# Patient Record
Sex: FEMALE | Race: ASIAN
[De-identification: names, ages, dates, MRNs, and addresses within clinical notes are randomized per-mention and may not be internally consistent; named-entity substitution may affect disease eponyms.]

---

## 2020-08-28 ENCOUNTER — HOSPITAL ENCOUNTER (INPATIENT)
Dept: HOSPITAL 92 - L&D | Age: 30
LOS: 5 days | Discharge: HOME | End: 2020-09-02
Attending: FAMILY MEDICINE | Admitting: FAMILY MEDICINE
Payer: MEDICAID

## 2020-08-28 VITALS — BODY MASS INDEX: 32.5 KG/M2

## 2020-08-28 DIAGNOSIS — O32.4XX0: ICD-10-CM

## 2020-08-28 DIAGNOSIS — O61.0: ICD-10-CM

## 2020-08-28 DIAGNOSIS — Z3A.37: ICD-10-CM

## 2020-08-28 DIAGNOSIS — E66.9: ICD-10-CM

## 2020-08-28 DIAGNOSIS — R00.0: ICD-10-CM

## 2020-08-28 LAB
GLUCOSE SERPL-MCNC: 86 MG/DL (ref 70–105)
HBSAG INDEX: 0.14 S/CO (ref 0–0.99)
HGB BLD-MCNC: 13.9 G/DL (ref 12–16)
MCH RBC QN AUTO: 31.3 PG (ref 27–31)
MCV RBC AUTO: 92.1 FL (ref 78–98)
PLATELET # BLD AUTO: 177 THOU/UL (ref 130–400)
RBC # BLD AUTO: 4.45 MILL/UL (ref 4.2–5.4)
SYPHILIS ANTIBODY INDEX: 0.02 S/CO
WBC # BLD AUTO: 9.2 THOU/UL (ref 4.8–10.8)

## 2020-08-28 PROCEDURE — U0003 INFECTIOUS AGENT DETECTION BY NUCLEIC ACID (DNA OR RNA); SEVERE ACUTE RESPIRATORY SYNDROME CORONAVIRUS 2 (SARS-COV-2) (CORONAVIRUS DISEASE [COVID-19]), AMPLIFIED PROBE TECHNIQUE, MAKING USE OF HIGH THROUGHPUT TECHNOLOGIES AS DESCRIBED BY CMS-2020-01-R: HCPCS

## 2020-08-28 PROCEDURE — 85014 HEMATOCRIT: CPT

## 2020-08-28 PROCEDURE — 82805 BLOOD GASES W/O2 SATURATION: CPT

## 2020-08-28 PROCEDURE — 85049 AUTOMATED PLATELET COUNT: CPT

## 2020-08-28 PROCEDURE — 76815 OB US LIMITED FETUS(S): CPT

## 2020-08-28 PROCEDURE — P9016 RBC LEUKOCYTES REDUCED: HCPCS

## 2020-08-28 PROCEDURE — S0020 INJECTION, BUPIVICAINE HYDRO: HCPCS

## 2020-08-28 PROCEDURE — 87340 HEPATITIS B SURFACE AG IA: CPT

## 2020-08-28 PROCEDURE — 86850 RBC ANTIBODY SCREEN: CPT

## 2020-08-28 PROCEDURE — 86901 BLOOD TYPING SEROLOGIC RH(D): CPT

## 2020-08-28 PROCEDURE — 51702 INSERT TEMP BLADDER CATH: CPT

## 2020-08-28 PROCEDURE — 86900 BLOOD TYPING SEROLOGIC ABO: CPT

## 2020-08-28 PROCEDURE — 36430 TRANSFUSION BLD/BLD COMPNT: CPT

## 2020-08-28 PROCEDURE — 82947 ASSAY GLUCOSE BLOOD QUANT: CPT

## 2020-08-28 PROCEDURE — 36415 COLL VENOUS BLD VENIPUNCTURE: CPT

## 2020-08-28 PROCEDURE — 85018 HEMOGLOBIN: CPT

## 2020-08-28 PROCEDURE — 85027 COMPLETE CBC AUTOMATED: CPT

## 2020-08-28 PROCEDURE — 36416 COLLJ CAPILLARY BLOOD SPEC: CPT

## 2020-08-28 PROCEDURE — 86780 TREPONEMA PALLIDUM: CPT

## 2020-08-28 PROCEDURE — 87635 SARS-COV-2 COVID-19 AMP PRB: CPT

## 2020-08-28 NOTE — PDOC.FPROB
FMR OB H&P: HPI





- History of Present Illness


Chief Complaint: mIOL for A2GDM


History of Present Illness: 


 Pt is a 29yo  @ 37.4wks by 8.3wk maximus who presents for mIOL due to A2GDM. 

She was late to care, originally receiving care in [place]. She denies any LOF, 

vag bleeding, discharge, dysuria, headache, NVD, vision changes, LE swelling. +

FM. Patient does desire epidural. She has been taking metformin for her A2GDM. 


Primary Care Physician: 





KB Nascimento





FMR OB H&P: Current Pregnancy





- Prenatal Care


: 1


Para: 0


Gestational age: 37.4


Due date: 20


Dating Criteria: 8.3 wk blanquitao


Course/Complications: 


 A2GDM, obesity, gHTN, late to care








- OB Labs


Blood type: B


RH: positive


Antibody Screen: negative


HIV: negative


RPR: negative


HepBsAg: negative


Rubella: immune


Gonorrhea: negative


Chlamydia: negative


Pap Smear: NILM. HPV neg


3 hour GTT: 2hr GTT 92/158/155


A1c: 5.3


GBS: negative


H&H: 20 12.4/38.8


Platelets: 185





- Additional Ultrasound


Additional: 


 20 EFW 85.1% EFW 3237g


20 BPP 10/10








FMR OB H&P: History





- Past Medical History


PMH: 





PCOS





- OB History


OB History: 





, sIUP





- GYN History


GYN History: 


 pap NILM








- Surgical History


Sx History: 





tonsillectomy





- Social History


Social History: 


no T/A/D








- Family History


Family History: 





ovarian and breast cancer, father with reji ataxia, one family member with 

congenital hearing loss





FMR OB H&P: Medications





- Current


Home Medications: 


 











 Medication  Instructions  Recorded  Confirmed  Type


 


Prenatal 21/Iron Fu/Folic Acid 1 tablet PO DAILY 20 History





[Prenatal Complete Caplet]    


 


metFORMIN [Glucophage] 500 mg PO BID-WM 20 History











Allergies/Adverse Reactions: 


 Allergies











Allergy/AdvReac Type Severity Reaction Status Date / Time


 


No Known Allergies Allergy   Verified 20 19:36














FMR OB H&P: ROS





- Review of Systems


General: denies: fever/chills, weight/appetite/sleep changes, night sweats, 

fatigue


Eyes: denies: vision changes, scotomas, floaters


ENT: denies: nasal congestion, rhinorrhea, sinus pain/pressure, sore throat


Cardiovascular: denies: chest pain, palpitation, edema


Respiratory: denies: cough, congestion, shortness of breath


Gastrointestinal: denies: abdominal pain, nausea, vomiting, diarrhea, 

constipation


Genitourinary (Female): denies: dysuria, vaginal discharge, vaginal pain, 

vaginal bleeding, contractions, vaginal pressure


Musculoskeletal: denies: pain, tenderness, swelling


Integumentary: denies: itching, rash


Breast: denies: skin changes





FMR OB H&P: Vital Signs





- Fetal Heart Tones


Baseline: 150


Variability: moderate


Acceleration: absent


Deceleration: absent


Category: category 1


[place] contractions every: irregular





FMR OB H&P: Physical Exam





- Physical Exam


General: NAD, awake, alert and oriented


HEENT: normocephalic and atraumatic, PERRLA, EOMI, MMM


Neck: supple, FROM


Chest: non-tender to palpation


Heart: RRR, normal S1/S2, no murmurs/rubs/gallops


General: CTAB, no respiratory distress, good air movement, no rales/rhonchi, no 

wheezing, no retractions


Abdomen: soft, gravid, non-tender


Musculoskeletal: FROM in all four extremities


Skin: no rash, good tugor, capillary refill <2 seconds


Psychiatric: intact recent and remote memory, good judgement and insight, 

normal mood and affect





- Pelvic Exam


SVE: 250/-2


Pathak score: 4


Membranes: intact





FMR OB H&P: A/P


Discussion: 


Date/Time: 20





#mIOL for A2GDM


- admit to L&D for mIOL


- SVE 2/50/-2, pathak score of 4. Will start with cytotec


- Monitor FHT; currently 150s, mod variability, cat 1


- US for EFW due to concern for LGA. Previous US 1 week ago with FW of 3200g.


- Desires epidural





#A2GDM


- monitor sugars q4hr, SS for sugars > 110


- hold metformin





#Obesity


- aware





#gHTN


- Monitor BPs for severe range. So far at goal. If severe range, will work up 

for Pre-E.








This H&P was discussed with Dr. KELLY Hummel and Dr. MAKAYLA Ballesteros who agree with the 

above documentation and plan.





Patient is a 29yo  here for mIOL due to A2GDM, uncontrolled. Will admit to L

&D for labor. Will get US with EFW. Pathak score of 4 with SVE of 2/50/-2. Will 

likely start with cytotech if EFW <4500g. Will recheck in 4 hours. Will check 

sugars q4hrs and give SS as needed for sugars >110. Will monitor BPs, so far 

BPs at goal. Discussed with Dr. Ballesteros. --- KELLY Hummel MD








Addendum - Attending





- Attending Attestation


Date/Time: 20 1388





I discussed the management with Dr. Singh last night and personally evaluated 

the patient with Dr Linn this morning.


I agree with the History, Examination, Assessment and Plan documented above 

with any addition or exceptions noted below.


Cervix remains unfavorable.  Repeat dose of cytotex. Patient may have epidural 

when she is ready.

## 2020-08-29 RX ADMIN — Medication SCH ML: at 13:18

## 2020-08-29 RX ADMIN — Medication SCH ML: at 21:20

## 2020-08-29 NOTE — ULT
OB ULTRASOUND:

 

Date:  08/28/2020

 

PROVIDED CLINICAL HISTORY:   

Gestational diabetes. 

 

FINDINGS:

 

No comparisons.

 

A single, live intrauterine gestation is documented in vertex presentation, with estimated gestationa
l age based on ultrasound of 38 weeks and 6 days. Estimated fetal weight is 3490 +/- 517 gm. Placenta
 is anteriorly located without evidence for previa. Amniotic fluid volume appears adequate for gestat
ional age. Fetal anatomic survey was not performed. 

 

Fetal Biometry:

BPD:  9.6 cm, 39 weeks/0 days

HC:  33.9 cm, 39 weeks/0 days

AC:  33.4 cm, 37 weeks/2 days

FL:  7.9 cm, 40 weeks/2 days

 

Fetal heart rate of 133 beats/minute is documented. 

The sonographer indicates fetal movement was identified. 

 

IMPRESSION: 

Single, live intrauterine gestation as described above. 

 

 

POS: FRANKY

## 2020-08-29 NOTE — PDOC.LDPN
Labor & Delivery Progress Note





- Subjective


Subjective: comfortable





- Objective


Vital signs reviewed and normal: yes


General: NAD, resting


Uterine fundus: non tender


SVE: 2/50/-3


FHT: category 1, variability present


[place] contractions every: irregular


-: 


#mIOL for A2GDM not well controlled and Gest HTN. 


- admit to L&D for mIOL


- SVE 2/50/-2, unchanged from previous check, pathak score of 4, gave 2nd dose 

of cytotec


- Monitor FHT; currently 150s, mod variability, cat 1


- US for EFW 3400g


- Desires epidural





#A2GDM


- monitor sugars q4hr, SS for sugars > 110


- hold metformin


- most recent BS 96





#Obesity


- aware





#gHTN


- Monitor BPs for severe range. So far at goal. If severe range, will work up 

for Pre-E.

## 2020-08-29 NOTE — PDOC.LDPN
Labor & Delivery Progress Note





- Subjective


Subjective: comfortable





- Objective


Abnormal vital signs: 


General: NAD, resting, breathing through contractions


Dilation: 3


Effacement: 75%


Station: -2


FHT: category 1


[place] contractions every: 1-3 minutes


Other exam findings: SROM with trace meconium @ cervical check @ ~1800


IUPC placed: yes


Resuscitative measures: maternal IV fluids


Plan: pitocin for augmentation


-: 





#mIOL for uncontrolled A2GDM and gHTN 


- SVE 3/75/-2 @ 1800 & pit temporarily dc'd 2/2 intractable pain from 

inadequate anesthesia. SROM shortly before last cervical check with trace 

meconium stained fluid noted per nurse. 


- New epidural now in place & patient MUCH more comfortable. IUPC placed and 

patient having inadequate contractions. Will restart pitocin & titrate to 

achieve adequate contractions as tolerated by the patient & fetus.


- Current FHTs: Cat I, baseline 140s, moderate variability, + accels, no decels





#A2GDM


- monitor sugars q4hr, SS for sugars > 110


- hold metformin


- most recent BS 90 @ ~1600





#Obesity


- aware





#gHTN


- Monitor BPs for severe range. So far at goal. If severe range, will work up 

for Pre-E & give PRN meds.





Dispo: Will recheck in ~2 hours to assess for cervical change. 

















Addendum - Attending





- Attending Attestation


Date/Time: 08/29/20 2115





I personally evaluated the patient and discussed the management with Dr. Nascimento.


I agree with the History, Examination, Assessment and Plan documented above 

with any addition or exceptions noted below.


We reviewed the care plan with Odalis and Alex.  They are relieved that the new 

epidural is working well.

## 2020-08-29 NOTE — PDOC.LDPN
Labor & Delivery Progress Note





- Subjective


Subjective: comfortable





- Objective


Vital signs reviewed and normal: yes


General: NAD, resting


Uterine fundus: non tender


Dilation: 3


Effacement: 75%


Station: -2


FHT: category 1


[employer] contractions every: uterine irritability


Plan: pitocin for augmentation


-: 


#mIOL for A2GDM not well controlled and Gest HTN. 


- SVE 2/50/-2 (Bower 3-4). Unchanged, will give cytotec #3


- SVE 3/75/-2 (Bower 8). Will start pitocin, titrate as necessary


- FHT: Cat I/ [employer]: uterine irritability


- US for EFW 3400g


- Desires epidural


- stadol x1 for some uterine discomfort





#A2GDM


- monitor sugars q4hr, SS for sugars > 110


- hold metformin


- most recent BS 91





#Obesity


- aware





#gHTN


- Monitor BPs for severe range. So far at goal. If severe range, will work up 

for Pre-E.

## 2020-08-29 NOTE — PDOC.LDPN
Labor & Delivery Progress Note





- Subjective


Subjective: comfortable





- Objective


Vital signs reviewed and normal: yes


General: NAD


Uterine fundus: non tender


Dilation: 3


Effacement: 75%


Station: -2


FHT: category 2


[place] contractions every: every 3-4 minutes


Plan: continue plan of care


-: 


#mIOL for A2GDM not well controlled and Gest HTN. 


- SVE 2/50/-2 (Bower 3-4). Unchanged, will give cytotec #3


- SVE 3/75/-2 (Bower 8). Will start pitocin, titrate as necessary


- SVE: 3/75/-2. Pit at 8


- FHT: Cat I/ [place]: 140s, minimal variability, no accels, cat II -- will give 

patient juice


- US for EFW 3400g


- Desires epidural


- stadol x1 for some uterine discomfort





#A2GDM


- monitor sugars q4hr, SS for sugars > 110


- hold metformin


- most recent BS 79





#Obesity


- aware





#gHTN


- Monitor BPs for severe range. So far at goal. If severe range, will work up 

for Pre-E.

## 2020-08-29 NOTE — PDOC.LDPN
Labor & Delivery Progress Note





- Subjective


Subjective: other (mild abd pain)





- Objective


Vital signs reviewed and normal: yes


General: NAD, resting


Dilation: 2


Effacement: 50%


Station: -3


FHT: category 1


[place] contractions every: uterine irritability


Plan: continue plan of care, labor augmentation


-: 





#mIOL for A2GDM not well controlled and Gest HTN. 


- SVE 2/50/-2 (Bower 3-4). Unchanged, will give cytotec #3


- FHT: Cat I/ [place]: Uterine irritability


- US for EFW 3400g


- Desires epidural


- stadol x1 for some uterine discomfort





#A2GDM


- monitor sugars q4hr, SS for sugars > 110


- hold metformin


- most recent BS 96





#Obesity


- aware





#gHTN


- Monitor BPs for severe range. So far at goal. If severe range, will work up 

for Pre-E.

## 2020-08-30 LAB
ANALYZER IN CARDIO: (no result)
ANALYZER IN CARDIO: (no result)
BASE EXCESS STD BLDA CALC-SCNC: -6.2 MEQ/L
BASE EXCESS STD BLDV CALC-SCNC: -4.7 MEQ/L
HCO3 BLDA-SCNC: 22.9 MEQ/L (ref 22–28)
HCO3 BLDV-SCNC: 23 MEQ/L (ref 22–28)
HGB BLD-MCNC: 11.7 G/DL (ref 12–16)
PH BLDV: 7.26 [PH] (ref 7.32–7.43)

## 2020-08-30 PROCEDURE — 0UQC7ZZ REPAIR CERVIX, VIA NATURAL OR ARTIFICIAL OPENING: ICD-10-PCS | Performed by: FAMILY MEDICINE

## 2020-08-30 PROCEDURE — 3E033VJ INTRODUCTION OF OTHER HORMONE INTO PERIPHERAL VEIN, PERCUTANEOUS APPROACH: ICD-10-PCS | Performed by: FAMILY MEDICINE

## 2020-08-30 PROCEDURE — 3E0P7VZ INTRODUCTION OF HORMONE INTO FEMALE REPRODUCTIVE, VIA NATURAL OR ARTIFICIAL OPENING: ICD-10-PCS | Performed by: FAMILY MEDICINE

## 2020-08-30 RX ADMIN — Medication SCH ML: at 03:53

## 2020-08-30 RX ADMIN — DOCUSATE CALCIUM SCH: 240 CAPSULE, LIQUID FILLED ORAL at 20:21

## 2020-08-30 NOTE — PDOC.CONS
- Consultation





I was called to the operating room by Dr. Ballesteros for assistance repairing an 

extension of the hysterotomy following the delivery of a male infant for 

nonreassuring fetal status and arrest of descent after pushing for 2 hours.  

After examining the patient, the hysterotomy was noted to have an extension on 

the left side, tracking deep inferiorly and completely transecting the cervix.  

A branch of the uterine artery was also noted to be involved and bleeding 

briskly.  Due to the depth of the extension and proximity to the ureter, I 

called for Dr. Bower or Shawn for assistance in the repair, both of whom 

were present. The cervical laceration was repaired using 0-monocryl.  

Hemostasis of the extension and involved bleeding artery was obtained with an [place] stitch.  The extension was then repaired and then incorporated into the 

hysterotomy repair, which was closed with 0 Monocryl with good hemostasis 

throughout. Floseal was applied across the repair and good hemostasis was again 

noted.  For full details on the remainder of the surgery, please see the 

operative report by Dr. Ballesteros.

## 2020-08-30 NOTE — PDOC.BPN
- Brief Progress Note


Patient reached complete cervical dilation about 6am. Nurs pushed with patient 

for alomst an hour, then the patietn pushed with our team of doctors for over 2 

hours.  Fetal station did not change signifiacntly, remainign about +2 station 

with increasing caput.  FHT baseline became tachycardic with minimal 

variability adnintermittent late decels during the final hour of pushing.  

Because of the lack of descent and Cat 2 tracing, I advised that a  

delivery was safest approach to an expedited deliver, I/R/B/A discussed a part 

of this conversation.  Consideration for vacuum assistance was given, but A2GDM 

and arrest of descent in the second stage of labor caused significant concern 

for shoulder dystocia.  Alex and Odalis expressed their disappointment that 

 was needed, but consented to  delivery.

## 2020-08-30 NOTE — PDOC.OPDEL
OB Operative/Delivery Note





- Additional Findings/Plan


Compilations/Other Findings: 





Date of Procedure: 2020





Resident Surgeons: Dr. Luiza Nascimento (PGY3, continuity provider), Dr. Brooklynn Flores 


Assistant Surgeon: Dr. Ana Steve (PGY1)


Attending Surgeon: Dr. Roberto Ballesteros, present and assisting during entire 

procedure





Intra-Operative Consult: Ob/Gyn Laborist, Dr. Renteria. Additional assistance 

provided by Dr. Bower & Dr. Escobar.





Procedure: Primary low transverse caesarean section





Anesthesia: epidural


Quantitative Blood Loss: 1920 mL


Complications: Postpartum hemorrhage requiring transfusion of 2 units packed 

red blood cells.


Specimens: Arterial and venous cord gases. Cord blood sent to lab for blood 

type.


Findings: Grossly normal male infant with Apgars of 8 and 9 at 1 an 5 minutes 

of life, respectively. Grossly normal placenta with 3 vessel cord discarded.


Drains: [place] to gravity draining blood-tinged urine.





Preoperative Diagnosis:


1) Term intrauterine pregnancy


2) A2GDM


3) Gestational HTN


4) Obesity


5) Late transfer of care


6) Arrest of second stage of labor


7) Persistent Category 2 FHT tracing





Postoperative Diagnosis:


1) Term intrauterine pregnancy, delivered by PLTCS


2) A2GDM


3) Gestational HTN


4) Obesity


5) Late transfer of care


6) Arrest of second stage or labor


7) Postpartum hemorrhage





Indications: The patient is a 30 year old  female at 37.6 weeks gestation 

who presented to L&D for medically indicated induction of A2GDM, followed by 

arrest of descent and non-reassuring fetal heart tones





Procedure in Detail: After risks, benefits, and alternatives were explained to 

the patient, she gave informed consent. Pre-operative antibiotics included 

Cefazolin 2 gram IV and Azithromycin 500mg IV. The patient was taken to the 

operating room, and epidural anesthesia was re-dosed. She was placed in the 

supine position with a left tilt and prepped and draped in usual sterile 

fashion. A Pfannenstiel incision was made with a scalpel and carried down to 

the level of the fascia which was sharply nicked. The fascial cut was extended 

bilaterally with [place] scissors. The inferior and superior edges of the cut 

fascial edges were elevated with Koc[place] clamps, and the underlying rectus 

muscles were sharply and bluntly dissected free. 





The recti were divided digitally and retracted manually. The peritoneum was 

entered bluntly and retracted manually. Bladder blade was placed. A low 

transverse score was made with the scalpel, and the uterus was entered in the 

midline bluntly. Clear fluid was seen. The hysterotomy was widened manually in 

a cephalo-cadual fashion. The infant was noted to be vertex and was delivered 

by flexion of the head and lift out of pelvis followed by fundal pressure. 

Mouth and nares were bulb suctioned. Cord clamped and cut; cord section for 

gases was obtained. Grossly normal male infant was handed to waiting nurse at 

the [facility] Cord blood was obtained. Placenta was manually extracted, found to 

be intact with 3 vessel cord and discarded. 





The uterus was externalized, and the endometrium was curetted with a dry lap. 

Uterus was boggy, so hemabate was given aong with IV pitocin. The bladder blade 

was replaced. Brisk bleeding was noted to be coming from a left extension of 

the hysterotomy, which tracked inferiorly and into the cervix. Ring clamps were 

placed and pressure applied for temporary hemostasis when consult for laborist 

Dr. Renteria was called. Dr. Renteria evaluated the extension and called Dr. Bower and Dr. Escobar. The extension involved a branch of the uterine artery. 

Repair was completed with 0-Monocryl and [place] stitch around branch of the 

uterine artery. The rest of the hysterotomy was closed in a running locking 

fashion with 0-Monocryl and noted to be hemostatic. The abdomen was irrigated 

with saline and suctioned free of clots.  Uterus was internalized. Floseal was 

applied; hysterotomy was again noted to be hemostatic.  Intraoperative EBL was 

monitored, and together with maternal clinical assessment by Dr James, the 

decision was made to proceed with PRBC transfusion of 2 units. 





The fascia was closed with a running non-locking 0-Vicryl suture. Peritoneum 

was closed with running non-locking 3-0 vicryl suture. The subcutaneous tissue 

was closed with 4 interrupted sutures using 2-0 chromic. The skin was 

approximated with staples, and a dressing was placed. All counts were correct. 

The patient tolerated the procedure well and was taken to the recovery room in 

stable condition with a unit of blood transfusing.





Post delivery plan: routine recovery

## 2020-08-30 NOTE — PDOC.LDPN
Labor & Delivery Progress Note





- Subjective


Subjective: painful contractions





- Objective


Vital signs reviewed and normal: yes


General: NAD


Uterine fundus: non tender


Dilation: 9


Effacement: 100%


Station: 0


FHT: category 1


AROM: meconium stained fluid


IUPC placed: yes


Resuscitative measures: maternal IV fluids, maternal position change


Plan: continue plan of care, labor augmentation, pitocin for augmentation


-: 





#mIOL for uncontrolled A2GDM and gHTN 


- SVE 3/75/-2 @ 1800 & pit temporarily dc'd 2/2 intractable pain from 

inadequate anesthesia. SROM shortly before @ 1575 cervical check with trace 

meconium stained fluid noted per nurse. 


- New epidural now in place & patient much more comfortable. IUPC placed. Pit 

restarted to titrate to achieve adequate contractions as tolerated by the 

patient & fetus.


- Current FHTs: Cat I, baseline 140s, moderate variability, + accels, no decels





#A2GDM


- monitor sugars q4hr, SS for sugars > 110


- hold metformin


- most recent BS 90 @ ~1600





#Obesity


- aware





#gHTN


- Monitor BPs for severe range. So far at goal. If severe range, will work up 

for Pre-E & give PRN meds.





Dispo: Will recheck in ~1 hours to assess for cervical change.

## 2020-08-31 LAB
HGB BLD-MCNC: 10.6 G/DL (ref 12–16)
HGB BLD-MCNC: 9 G/DL (ref 12–16)
MCH RBC QN AUTO: 31.2 PG (ref 27–31)
MCV RBC AUTO: 91.9 FL (ref 78–98)
PLATELET # BLD AUTO: 108 THOU/UL (ref 130–400)
RBC # BLD AUTO: 2.89 MILL/UL (ref 4.2–5.4)
WBC # BLD AUTO: 11.8 THOU/UL (ref 4.8–10.8)

## 2020-08-31 RX ADMIN — SIMETHICONE PRN MG: 80 TABLET, CHEWABLE ORAL at 21:49

## 2020-08-31 RX ADMIN — Medication PRN TUBE: at 18:17

## 2020-08-31 RX ADMIN — HYDROCODONE BITARTRATE AND ACETAMINOPHEN PRN TAB: 5; 325 TABLET ORAL at 23:10

## 2020-08-31 RX ADMIN — HYDROCODONE BITARTRATE AND ACETAMINOPHEN PRN TAB: 5; 325 TABLET ORAL at 09:23

## 2020-08-31 RX ADMIN — HYDROCODONE BITARTRATE AND ACETAMINOPHEN PRN TAB: 5; 325 TABLET ORAL at 18:16

## 2020-08-31 RX ADMIN — DOCUSATE CALCIUM SCH MG: 240 CAPSULE, LIQUID FILLED ORAL at 21:49

## 2020-08-31 RX ADMIN — DOCUSATE CALCIUM SCH MG: 240 CAPSULE, LIQUID FILLED ORAL at 07:46

## 2020-08-31 NOTE — PDOC.OBPPN
FMR OB Postpartum PN: Subj





- Interval History


Hospital Day: 4


Postpartum Day: 





1


Chief Complaint: Some abdominal pain with movement.


Indentification: G1 now P1 who is PP day #1 s/p pLTCS for failure to progress 

in 2nd stage 


Interval History: Patient received 2U PRBCs following surgery 2/2 PPH.





FMR OB Postpartum PN: Obj





- Maternal


Vital signs: 


BP: 96/51  HR: 120 RR: 16 Tmax: 98.9F Pox: 96% on RA  Wt: 93 kg   








- Urine output


I&O: 


 











 20





 06:59 06:59 06:59


 


Intake Total   1859


 


Output Total   2476


 


Balance   -617














- Lochia


Lochia: 





minimal





- Pain Management


Pain scale: 0


Intervention: oral medication





FMR OB Postpartum PN: Exam





- Physical Exam


General: NAD, awake, alert and oriented


HEENT: normocephalic and atraumatic, MMM, conjunctiva clear, grossly normal 

vision, grossly normal hearing


Neck: supple, FROM


Heart: normal S1/S2, no murmurs/rubs/gallops, no edema


Deviation from normal: tachycardic but regular rhythm


General: CTAB, no respiratory distress, good air movement


Abdomen: soft, fundus(cm) (firm below umbilicus)


Musculoskeletal: normal gait and station, FROM in all four extremities


Neurological: cranial nerves II through XII intact, sensation to pain,touch and 

proprioception grossly normal, no focal deficit


Skin: no rash, good tugor


Postpartum: bandage intact (clean & dry on exam)


Lymphatic: no unusual bruising or bleeding


Psychiatric: intact recent and remote memory





- Pelvic Exam


Postpartum: no discharge, no edema, normal lochia





FMR OB Postpartum PN: Data





- Labs


Lab results: 


 Laboratory Results - last 24 hr











  20





  19:55 11:10 11:11


 


WBC   


 


RBC   


 


Hgb   


 


Hct   


 


MCV   


 


MCH   


 


MCHC   


 


RDW   


 


Plt Count   


 


MPV   


 


Bicarbonate Actual   22.9 


 


ABG Base Excess   -6.2 L 


 


VBG HCO3    23


 


VBG Base Excess    -4.7 L


 


Cord ABG pH   7.201 L 


 


Cord ABG PCO2 ([place])   59.9 H 


 


Cord VBG pH    7.26 L


 


Cord VBG pCO2    52.5


 


POC Glucose   


 


Blood Type  B POSITIVE  


 


Antibody Screen  NEGATIVE  


 


Crossmatch  See Detail  














  20/20





  11:39 18:00 00:21


 


WBC   


 


RBC   


 


Hgb   11.7 L 


 


Hct   33.5 L 


 


MCV   


 


MCH   


 


MCHC   


 


RDW   


 


Plt Count   


 


MPV   


 


Bicarbonate Actual   


 


ABG Base Excess   


 


VBG HCO3   


 


VBG Base Excess   


 


Cord ABG pH   


 


Cord ABG PCO2 ([place])   


 


Cord VBG pH   


 


Cord VBG pCO2   


 


POC Glucose  98   99


 


Blood Type   


 


Antibody Screen   


 


Crossmatch   














  20





  04:33 05:56


 


WBC   11.8 H


 


RBC   2.89 L


 


Hgb   9.0 L


 


Hct   26.6 L


 


MCV   91.9


 


MCH   31.2 H


 


MCHC   33.9


 


RDW   12.4


 


Plt Count   108 L


 


MPV   10.0


 


Bicarbonate Actual  


 


ABG Base Excess  


 


VBG HCO3  


 


VBG Base Excess  


 


Cord ABG pH  


 


Cord ABG PCO2 ([place])  


 


Cord VBG pH  


 


Cord VBG pCO2  


 


POC Glucose  89 


 


Blood Type  


 


Antibody Screen  


 


Crossmatch  














FMR OB Postpartum PN: A/P





- Problem List


(1) Postpartum care following  delivery


Current Visit: Yes   Status: Acute   Code(s): Z39.2 - ENCOUNTER FOR ROUTINE 

POSTPARTUM FOLLOW-UP   





(2) Gestational diabetes mellitus (GDM)


Current Visit: Yes   Status: Acute   Code(s): O24.419 - GESTATIONAL DIABETES 

MELLITUS IN PREGNANCY, UNSP CONTROL   


Qualifiers: 


   Gestational diabetes mellitus control: oral hypoglycemic-controlled 





(3) Gestational hypertension


Current Visit: Yes   Status: Acute   Code(s): O13.9 - GESTATIONAL HTN W/O 

SIGNIFICANT PROTEINURIA, UNSP TRIMESTER   





(4) Obesity


Current Visit: Yes   Status: Acute   Code(s): E66.9 - OBESITY, UNSPECIFIED   


Disposition: 





31YO G1 now  who is PP day #1 s/p pLTCS for arrest of descent.





#PP day #1 s/p pLTCS for arrest of descent:


- Tolerating PO and passing gas. No voiding yet as gao still in. Pain 

controlled on current regimen. Incision covered with pressure dressing but 

dressing was clean & dry. Continue routine post-op care. 





#PPH: QBL ~1929mL & was given 2U PRBCs since delivery. Hgb down to 9.0 this AM 

& HR in 120s @ 0400. If still tachycardic @ next vital check with recheck H/H 

this afternoon & consider giving more blood products. 





#A2GDM


- monitor sugars q4hr but max since admission 114 prior to delivery. All WNLs 

since delivery. Will check ACHS today & likely dc accuchecks tomorrow 


- Continue to hold metformin





#Obesity


- aware





#gHTN


- NL/low BPs since delivery & no severe range pressures since admission. If 

severe range, will work up for Pre-E & give PRN meds.





Dispo: Will continue routine post-C/S care & continue to monitor VS closely for 

need for further transfusion. 





Discussion: 


Date/Time: 20 1109











This H&P was discussed with Dr. Moore who agree with the above documentation 

and plan.








Addendum - Attending





- Attending Attestation


Date/Time: 20 104





I personally evaluated the patient and discussed the management with Dr. Nascimento. 


I agree with the History, Examination, Assessment and Plan documented above 

with any addition or exceptions noted below.





repeat CBC this afternoon due to persistent tachycardia. patient asymptomatic. 

Will consider additional transfusion of blood products if it continues to down 

trend.

## 2020-09-01 LAB — HGB BLD-MCNC: 8.9 G/DL (ref 12–16)

## 2020-09-01 PROCEDURE — 30233N1 TRANSFUSION OF NONAUTOLOGOUS RED BLOOD CELLS INTO PERIPHERAL VEIN, PERCUTANEOUS APPROACH: ICD-10-PCS | Performed by: FAMILY MEDICINE

## 2020-09-01 RX ADMIN — HYDROCODONE BITARTRATE AND ACETAMINOPHEN PRN TAB: 5; 325 TABLET ORAL at 18:44

## 2020-09-01 RX ADMIN — SIMETHICONE PRN MG: 80 TABLET, CHEWABLE ORAL at 07:48

## 2020-09-01 RX ADMIN — Medication PRN TUBE: at 21:25

## 2020-09-01 RX ADMIN — SIMETHICONE PRN MG: 80 TABLET, CHEWABLE ORAL at 20:29

## 2020-09-01 RX ADMIN — HYDROCODONE BITARTRATE AND ACETAMINOPHEN PRN TAB: 5; 325 TABLET ORAL at 23:25

## 2020-09-01 RX ADMIN — HYDROCODONE BITARTRATE AND ACETAMINOPHEN PRN TAB: 5; 325 TABLET ORAL at 03:11

## 2020-09-01 RX ADMIN — DOCUSATE CALCIUM SCH MG: 240 CAPSULE, LIQUID FILLED ORAL at 07:48

## 2020-09-01 RX ADMIN — HYDROCODONE BITARTRATE AND ACETAMINOPHEN PRN TAB: 5; 325 TABLET ORAL at 07:49

## 2020-09-01 RX ADMIN — DOCUSATE CALCIUM SCH MG: 240 CAPSULE, LIQUID FILLED ORAL at 21:26

## 2020-09-01 RX ADMIN — HYDROCODONE BITARTRATE AND ACETAMINOPHEN PRN TAB: 5; 325 TABLET ORAL at 13:30

## 2020-09-01 NOTE — PDOC.OBPPN
FMR OB Postpartum PN: Subj





- Interval History


Hospital Day: 5


Postpartum Day: 





2


Chief Complaint: Abodminal cramping & pain with movement.


Indentification: G1 now  who is PP day #2 s/p pLTCS for failure of descent.


Interval History: Hgb continue to downtrend & HR still elevated. Limited 

movement 2/2 pain.





FMR OB Postpartum PN: Obj





- Maternal


Vital signs: 


BP: 106/64  HR: 107 RR: 14 Tmax: 99.0F Pox: 97% on RA  Wt: 93 kg   








- Urine output


I&O: 


 











 20





 06:59 06:59 06:59


 


Intake Total  1859 


 


Output Total  7705 1851


 


Balance  -617 -1851














- Lochia


Lochia: 





moderate





- Pain Management


Pain scale: 0


Intervention: oral medication





FMR OB Postpartum PN: Exam





- Physical Exam


General: NAD, awake, alert and oriented


HEENT: normocephalic and atraumatic, MMM, grossly normal vision, grossly normal 

hearing


Neck: supple, FROM


Heart: normal S1/S2, no murmurs/rubs/gallops, no edema, other (tachycardic but 

regular rhythm)


General: CTAB, no respiratory distress, good air movement


Abdomen: soft, fundus(cm) (firm just above umbilicus)


Musculoskeletal: FROM in all four extremities


Neurological: cranial nerves II through XII intact, sensation to pain,touch and 

proprioception grossly normal, no focal deficit


Skin: no rash


Postpartum: incision healing well, no erythema, no edema, no drainage, 

appropriately tender


Lymphatic: no unusual bruising or bleeding


Psychiatric: intact recent and remote memory, good judgement and insight, 

normal mood and affect





- Pelvic Exam


Postpartum: no discharge, no edema, normal lochia





FMR OB Postpartum PN: Data





- Labs


Lab results: 


 Laboratory Results - last 24 hr











  20





  13:30 14:05 17:34


 


Hgb   10.6 L 


 


Hct   31.7 L 


 


POC Glucose  91   124 H














  20





  22:10 05:29 06:05


 


Hgb   8.9 L 


 


Hct   26.9 L 


 


POC Glucose  132 H   103














FMR OB Postpartum PN: A/P





- Problem List


(1) Postpartum care following  delivery


Current Visit: Yes   Status: Acute   Code(s): Z39.2 - ENCOUNTER FOR ROUTINE 

POSTPARTUM FOLLOW-UP   





(2) Gestational diabetes mellitus (GDM)


Current Visit: Yes   Status: Acute   Code(s): O24.419 - GESTATIONAL DIABETES 

MELLITUS IN PREGNANCY, UNSP CONTROL   


Qualifiers: 


   Gestational diabetes mellitus control: oral hypoglycemic-controlled 





(3) Gestational hypertension


Current Visit: Yes   Status: Acute   Code(s): O13.9 - GESTATIONAL HTN W/O 

SIGNIFICANT PROTEINURIA, UNSP TRIMESTER   





(4) Obesity


Current Visit: Yes   Status: Acute   Code(s): E66.9 - OBESITY, UNSPECIFIED   


Disposition: 





29YO G1 now  who is PP day #2 s/p pLTCS for arrest of descent.





#PP day #2 s/p pLTCS for arrest of descent:


- Tolerating PO, voiding, ambulating, and passing gas. Reports lochia is 

similar to menstruation. Pain semi-controlled on current regimen. Incision 

clean & dry & intact. Continue routine post-op care. 





#PPH: QBL ~2127mL since delivery. Is s/p 2U PRBCs since delivery. Hgb down to 

8.9 this AM but HR improved. Will continue PNVs & PO iron.





#post-op pain: Will add 2 tabs of Norco PRN in addition to SHAYNA motrin today. 

Encouraged ambulation as some discomfort could also be from gas/constipation & 

ambulation with help with this. Adding PRN miralax & simethicone added 

overnight.





#A2GDM


- Will continue QID accuchecks today, fasting & 2hr PP, as max BG yesterday was 

132 before dinner. 


- Continue to hold metformin for now. 


- Needs 6 week PP DM screening.





#Obesity


- aware





#gHTN


- NL/low BPs since delivery & no severe range pressures since admission. If 

severe range, will work up for Pre-E & give PRN meds.





Dispo: Will continue routine post-C/S care & continue to monitor VS closely. 





Discussion: 


Date/Time: 20 0656











This H&P was discussed with Dr. Moore who agree with the above documentation 

and plan.








Addendum - Attending





- Attending Attestation


Date/Time: 20 1000





I personally evaluated the patient and discussed the management with Dr. Nascimento. 


I agree with the History, Examination, Assessment and Plan documented above 

with any addition or exceptions noted below.





Patient dizzy upon standing this morning. If this persists this afternoon in 

the context of persistently elevated pulse, will crossmatch and tx 1u PRBCs.

## 2020-09-01 NOTE — PDOC.BPN
- Brief Progress Note





Pt is a 29 yo  who is PP day 2. She is resting comfortably in bed. She is 

still tachycardic and on exam, at rest, is at 120. She endorses dizziness 

whenever she gets up to walk and she looks pale. Her H/H was 8.9/26.9 this 

morning which was down from previous H/H. Discussed the plan to transfuse 1 U 

packed RBCs and recheck H/H in the AM. She agreed with the plan and voiced 

understanding.

## 2020-09-02 VITALS — TEMPERATURE: 97.9 F | DIASTOLIC BLOOD PRESSURE: 84 MMHG | SYSTOLIC BLOOD PRESSURE: 122 MMHG

## 2020-09-02 LAB
HGB BLD-MCNC: 10.1 G/DL (ref 12–16)
PLATELET # BLD AUTO: 141 THOU/UL (ref 130–400)

## 2020-09-02 RX ADMIN — HYDROCODONE BITARTRATE AND ACETAMINOPHEN PRN TAB: 5; 325 TABLET ORAL at 13:15

## 2020-09-02 RX ADMIN — HYDROCODONE BITARTRATE AND ACETAMINOPHEN PRN TAB: 5; 325 TABLET ORAL at 08:27

## 2020-09-02 RX ADMIN — DOCUSATE CALCIUM SCH MG: 240 CAPSULE, LIQUID FILLED ORAL at 08:27

## 2020-09-02 RX ADMIN — SIMETHICONE PRN MG: 80 TABLET, CHEWABLE ORAL at 08:32

## 2020-09-02 RX ADMIN — Medication PRN TUBE: at 13:15

## 2020-09-02 RX ADMIN — HYDROCODONE BITARTRATE AND ACETAMINOPHEN PRN TAB: 5; 325 TABLET ORAL at 03:18

## 2020-09-02 NOTE — PDOC.OBPPN
FMR OB Postpartum PN: Subj





- Interval History


Hospital Day: 6


Postpartum Day: 





3


Indentification: G1 now P1 who is PP day #3 s/p pLTCS for failure of descent.


Interval History: Now s/p 3U PRBCs since surgery. HR improved this AM. Pain 

controlled. 





FMR OB Postpartum PN: Obj





- Maternal


Vital signs: 


BP: 125/73  HR: 104 RR: 16 Tmax: 99.1F Pox: 97% on RA  Wt: 93 kg   








- Urine output


I&O: 


 











 20





 06:59 06:59 06:59


 


Intake Total 1859  700


 


Output Total 2476 9751 1200


 


Balance -617 -1851 -500














- Lochia


Lochia: 





minimal





- Pain Management


Intervention: oral medication





FMR OB Postpartum PN: Exam





- Physical Exam


General: NAD, awake, alert and oriented


HEENT: MMM, conjunctiva clear, grossly normal vision, grossly normal hearing


Neck: supple, FROM


Heart: normal S1/S2, no edema, other (tachycardic with regular rhythm)


General: CTAB, no respiratory distress


Abdomen: soft, fundus(cm) (firm above umbilicus)


Musculoskeletal: FROM in all four extremities


Neurological: cranial nerves II through XII intact, sensation to pain,touch and 

proprioception grossly normal, no focal deficit


Skin: no rash


Postpartum: incision healing well, no erythema, no edema, no drainage, 

appropriately tender


Lymphatic: no unusual bruising or bleeding


Psychiatric: intact recent and remote memory, good judgement and insight, 

normal mood and affect





- Pelvic Exam


Postpartum: no discharge, no edema, normal lochia





FMR OB Postpartum PN: Data





- Labs


Lab results: 


 Laboratory Results - last 24 hr











  20





  19:55 11:21 13:22


 


POC Glucose   145 H  96


 


Blood Type   


 


Antibody Screen   


 


Ab Screen Tube Method   


 


Crossmatch  See Detail  














  20





  15:07 18:07 21:48


 


POC Glucose   121 H  134 H


 


Blood Type  B POSITIVE  


 


Antibody Screen  Cancelled  


 


Ab Screen Tube Method  NEGATIVE  


 


Crossmatch  See Detail  














  20





  05:37


 


POC Glucose  86


 


Blood Type 


 


Antibody Screen 


 


Ab Screen Tube Method 


 


Crossmatch 














FMR OB Postpartum PN: A/P





- Problem List


(1) Postpartum care following  delivery


Current Visit: Yes   Status: Acute   Code(s): Z39.2 - ENCOUNTER FOR ROUTINE 

POSTPARTUM FOLLOW-UP   





(2) Gestational diabetes mellitus (GDM)


Current Visit: Yes   Status: Acute   Code(s): O24.419 - GESTATIONAL DIABETES 

MELLITUS IN PREGNANCY, UNSP CONTROL   


Qualifiers: 


   Gestational diabetes mellitus control: oral hypoglycemic-controlled 





(3) Gestational hypertension


Current Visit: Yes   Status: Acute   Code(s): O13.9 - GESTATIONAL HTN W/O 

SIGNIFICANT PROTEINURIA, UNSP TRIMESTER   


Qualifiers: 


   Trimester: unspecified trimester   Qualified Code(s): O13.9 - Gestational [

pregnancy-induced] hypertension without significant proteinuria, unspecified 

trimester   





(4) Obesity


Current Visit: Yes   Status: Acute   Code(s): E66.9 - OBESITY, UNSPECIFIED   


Disposition: 





31YO G1 now  who is PP day #3 s/p pLTCS for arrest of descent.





#PP day #3 s/p pLTCS for arrest of descent:


- Tolerating PO, voiding, ambulating, and passing gas. Reports lochia is 

similar to menstruation. Pain controlled on current regimen. Incision clean & 

dry & intact. Continue routine post-op care. 





#PPH: QBL ~2127mL since delivery. Now s/p 3U PRBCs since delivery. Hgb up to ? 

this AM & HR improved. Will continue PNVs & PO iron PP for at least 3 months.





#post-op pain: Continue Norco PRN & SHAYNA motrin today. Will also continue PRN 

miralax & simethicone added overnight.





#A2GDM


- 1/4 BG levels WNLs y-day w/ accuchecks. Will continue QID accuchecks today. 

So far AM fasting WNLs. 


- Continue to hold metformin.


- Needs 6 week PP DM screening.





#Obesity


- aware





#gHTN


- BPs WNLs since delivery & no severe range pressures since admission. If 

severe range, will work up for Pre-E & give PRN meds.





Dispo: Will continue routine post-C/S care & continue to monitor VS closely. 

Anticipate likely d/c home today with close follow-up vs. tomorrow due to 

excessive infant weight loss/breastfeeding difficulties.


Discussion: 


Date/Time: 20 0648











This H&P was discussed with Dr. Moore who agrees with the above documentation 

and plan.








Addendum - Attending





- Attending Attestation


Date/Time: 20 9454





I personally evaluated the patient and discussed the management with Dr. Nascimento. 


I agree with the History, Examination, Assessment and Plan documented above 

with any addition or exceptions noted below.





Feeling better after blood tx yesterday. Will d/c home today. F/U 1 wk at [facility]. 

staples removed today. sent rx for norco.

## 2020-09-08 ENCOUNTER — HOSPITAL ENCOUNTER (EMERGENCY)
Dept: HOSPITAL 92 - ERS | Age: 30
Discharge: HOME | End: 2020-09-08
Payer: COMMERCIAL

## 2020-09-08 DIAGNOSIS — N39.0: ICD-10-CM

## 2020-09-08 DIAGNOSIS — T81.40XA: Primary | ICD-10-CM

## 2020-09-08 DIAGNOSIS — L03.311: ICD-10-CM

## 2020-09-08 LAB
ALBUMIN SERPL BCG-MCNC: 3.1 G/DL (ref 3.5–5)
ALP SERPL-CCNC: 97 U/L (ref 40–110)
ALT SERPL W P-5'-P-CCNC: 11 U/L (ref 8–55)
ANION GAP SERPL CALC-SCNC: 16 MMOL/L (ref 10–20)
AST SERPL-CCNC: 15 U/L (ref 5–34)
BASOPHILS # BLD AUTO: 0.1 THOU/UL (ref 0–0.2)
BASOPHILS NFR BLD AUTO: 0.7 % (ref 0–1)
BILIRUB SERPL-MCNC: 0.3 MG/DL (ref 0.2–1.2)
BUN SERPL-MCNC: 22 MG/DL (ref 7–18.7)
CALCIUM SERPL-MCNC: 8.7 MG/DL (ref 7.8–10.44)
CHLORIDE SERPL-SCNC: 107 MMOL/L (ref 98–107)
CO2 SERPL-SCNC: 19 MMOL/L (ref 22–29)
CREAT CL PREDICTED SERPL C-G-VRATE: 0 ML/MIN (ref 70–130)
EOSINOPHIL # BLD AUTO: 0.1 THOU/UL (ref 0–0.7)
EOSINOPHIL NFR BLD AUTO: 1.1 % (ref 0–10)
GLOBULIN SER CALC-MCNC: 3.4 G/DL (ref 2.4–3.5)
GLUCOSE SERPL-MCNC: 82 MG/DL (ref 70–105)
HGB BLD-MCNC: 12.1 G/DL (ref 12–16)
LEUKOCYTE ESTERASE UR QL STRIP.AUTO: 500 LEU/UL
LYMPHOCYTES # BLD: 2.1 THOU/UL (ref 1.2–3.4)
LYMPHOCYTES NFR BLD AUTO: 18.5 % (ref 21–51)
MCH RBC QN AUTO: 31 PG (ref 27–31)
MCV RBC AUTO: 92.4 FL (ref 78–98)
MONOCYTES # BLD AUTO: 0.7 THOU/UL (ref 0.11–0.59)
MONOCYTES NFR BLD AUTO: 5.7 % (ref 0–10)
NEUTROPHILS # BLD AUTO: 8.4 THOU/UL (ref 1.4–6.5)
NEUTROPHILS NFR BLD AUTO: 74.1 % (ref 42–75)
PLATELET # BLD AUTO: 325 THOU/UL (ref 130–400)
POTASSIUM SERPL-SCNC: 5.2 MMOL/L (ref 3.5–5.1)
RBC # BLD AUTO: 3.9 MILL/UL (ref 4.2–5.4)
SODIUM SERPL-SCNC: 137 MMOL/L (ref 136–145)
SP GR UR STRIP: 1.01 (ref 1–1.04)
WBC # BLD AUTO: 11.3 THOU/UL (ref 4.8–10.8)

## 2020-09-08 PROCEDURE — 81003 URINALYSIS AUTO W/O SCOPE: CPT

## 2020-09-08 PROCEDURE — 96360 HYDRATION IV INFUSION INIT: CPT

## 2020-09-08 PROCEDURE — 81015 MICROSCOPIC EXAM OF URINE: CPT

## 2020-09-08 PROCEDURE — 87086 URINE CULTURE/COLONY COUNT: CPT

## 2020-09-08 PROCEDURE — 96361 HYDRATE IV INFUSION ADD-ON: CPT

## 2020-09-08 PROCEDURE — 74177 CT ABD & PELVIS W/CONTRAST: CPT

## 2020-09-08 PROCEDURE — 85025 COMPLETE CBC W/AUTO DIFF WBC: CPT

## 2020-09-08 PROCEDURE — 80053 COMPREHEN METABOLIC PANEL: CPT

## 2020-09-08 PROCEDURE — 36415 COLL VENOUS BLD VENIPUNCTURE: CPT

## 2020-09-08 NOTE — CT
CT ABDOMEN AND PELVIS WITH IV CONTRAST:

 

Date  2020

 

HISTORY:  Post . Abdominal pain. Fever. 

 

FINDINGS:

Lung bases clear. Liver, spleen, and pancreas are unremarkable. Adrenal glands and kidneys are unrema
rkable. No hydronephrosis. Urinary bladder is mildly distended, but otherwise unremarkable. 

 

Small bowel loops normal caliber. The cecum loops in the right abdomen and is located in the right up
per quadrant. Appendix is probably identified and appears unremarkable. There is stool throughout the
 colon. 

 

The uterus is prominent, consistent with postpartum state. The endometrium is thickened with increase
d blood or fluid in the endometrial cavity. No significant free fluid seen in the pelvis or abdomen. 


 

Subcutaneous haziness seen in the midline of lower abdomen consistent with postoperative changes in t
he subcutaneous tissues at the incision site. 

 

Mild prominence within the abdominal wall just lateral to the rectus abdominus muscle in the lower ab
domen may represent small hematoma within the abdominal wall. This measures approximately 2.0 cm AP d
imension. 

 

IMPRESSION: 

1.  Postoperative changes consistent with recent  as described above. No significant free fl
uid or blood within the abdomen or pelvis. No evidence of confined fluid or abscess collection and no
 evidence of extraluminal gas. 

 

2.  Postoperative changes in the subcutaneous tissues at the incision site and possible small hematom
a in the abdominal wall on the left just lateral to the rectus muscle. 

 

 

 

POS: AH